# Patient Record
Sex: FEMALE | Race: ASIAN | NOT HISPANIC OR LATINO | URBAN - METROPOLITAN AREA
[De-identification: names, ages, dates, MRNs, and addresses within clinical notes are randomized per-mention and may not be internally consistent; named-entity substitution may affect disease eponyms.]

---

## 2023-09-10 ENCOUNTER — EMERGENCY (EMERGENCY)
Facility: HOSPITAL | Age: 48
LOS: 1 days | Discharge: HOME | End: 2023-09-10
Attending: EMERGENCY MEDICINE | Admitting: EMERGENCY MEDICINE
Payer: COMMERCIAL

## 2023-09-10 VITALS
SYSTOLIC BLOOD PRESSURE: 122 MMHG | RESPIRATION RATE: 18 BRPM | HEART RATE: 68 BPM | DIASTOLIC BLOOD PRESSURE: 69 MMHG | OXYGEN SATURATION: 100 %

## 2023-09-10 VITALS
SYSTOLIC BLOOD PRESSURE: 145 MMHG | OXYGEN SATURATION: 100 % | TEMPERATURE: 98 F | DIASTOLIC BLOOD PRESSURE: 87 MMHG | RESPIRATION RATE: 18 BRPM | HEART RATE: 72 BPM

## 2023-09-10 PROCEDURE — 99285 EMERGENCY DEPT VISIT HI MDM: CPT

## 2023-09-10 PROCEDURE — 73200 CT UPPER EXTREMITY W/O DYE: CPT | Mod: 26,LT

## 2023-09-10 PROCEDURE — 73080 X-RAY EXAM OF ELBOW: CPT | Mod: 26,LT

## 2023-09-10 PROCEDURE — 73090 X-RAY EXAM OF FOREARM: CPT | Mod: 26,LT

## 2023-09-10 PROCEDURE — 73060 X-RAY EXAM OF HUMERUS: CPT | Mod: 26,LT

## 2023-09-10 PROCEDURE — 70450 CT HEAD/BRAIN W/O DYE: CPT | Mod: 26

## 2023-09-10 PROCEDURE — 72125 CT NECK SPINE W/O DYE: CPT | Mod: 26

## 2023-09-10 RX ORDER — IBUPROFEN 200 MG
1 TABLET ORAL
Qty: 30 | Refills: 0
Start: 2023-09-10

## 2023-09-10 RX ORDER — ONDANSETRON 8 MG/1
4 TABLET, FILM COATED ORAL ONCE
Refills: 0 | Status: COMPLETED | OUTPATIENT
Start: 2023-09-10 | End: 2023-09-10

## 2023-09-10 RX ORDER — KETOROLAC TROMETHAMINE 30 MG/ML
15 SYRINGE (ML) INJECTION ONCE
Refills: 0 | Status: DISCONTINUED | OUTPATIENT
Start: 2023-09-10 | End: 2023-09-10

## 2023-09-10 RX ORDER — MORPHINE SULFATE 50 MG/1
4 CAPSULE, EXTENDED RELEASE ORAL ONCE
Refills: 0 | Status: DISCONTINUED | OUTPATIENT
Start: 2023-09-10 | End: 2023-09-10

## 2023-09-10 RX ORDER — OXYCODONE AND ACETAMINOPHEN 5; 325 MG/1; MG/1
1 TABLET ORAL
Qty: 12 | Refills: 0
Start: 2023-09-10 | End: 2023-09-12

## 2023-09-10 RX ADMIN — MORPHINE SULFATE 4 MILLIGRAM(S): 50 CAPSULE, EXTENDED RELEASE ORAL at 14:01

## 2023-09-10 RX ADMIN — Medication 15 MILLIGRAM(S): at 15:42

## 2023-09-10 RX ADMIN — ONDANSETRON 4 MILLIGRAM(S): 8 TABLET, FILM COATED ORAL at 14:01

## 2023-09-10 RX ADMIN — MORPHINE SULFATE 4 MILLIGRAM(S): 50 CAPSULE, EXTENDED RELEASE ORAL at 15:42

## 2023-09-10 NOTE — ED ADULT NURSE NOTE - OBJECTIVE STATEMENT
Patient presents with complaints of severe left elbow and arm pain s/p slip and fall outside in the rain. + deformity. Unsure of she hit her head. DIdn't LOC. Alert and oriented x 3. Denies nausea, vomiting, fever, chills.

## 2023-09-10 NOTE — ED ADULT NURSE NOTE - NSFALLRISKINTERV_ED_ALL_ED
Assistance OOB with selected safe patient handling equipment if applicable/Assistance with ambulation/Communicate fall risk and risk factors to all staff, patient, and family/Monitor gait and stability/Provide visual cue: yellow wristband, yellow gown, etc/Reinforce activity limits and safety measures with patient and family/Call bell, personal items and telephone in reach/Instruct patient to call for assistance before getting out of bed/chair/stretcher/Non-slip footwear applied when patient is off stretcher/[place] to call system/Physically safe environment - no spills, clutter or unnecessary equipment/Purposeful Proactive Rounding/Room/bathroom lighting operational, light cord in reach

## 2023-09-10 NOTE — ED PROVIDER NOTE - PHYSICAL EXAMINATION
Constitutional: crying in pain  HEENT: head normocephalic and atraumatic. moist mucous membranes  Eyes: extraocular movements intact, normal conjunctiva  Neck: supple, normal ROM  Cardiovascular: regular rate   Pulmonary: no respiratory distress  Gastrointestinal: abdomen flat and nondistended, nontender to palpation  Skin: warm, dry, normal for ethnicity  Musculoskeletal: L elbow swollen and very TTP, unable to range elbow 2/2 pain.  no midline spinal TTP  Neurological: oriented x4, no focal neurologic deficit.

## 2023-09-10 NOTE — ED PROVIDER NOTE - OBJECTIVE STATEMENT
48-year-old female no past medical history presents with left elbow pain and deformity after mechanical slip and fall just prior to arrival in ED.  Patient was walking on wet cobblestones and slipped, falling on left elbow.  Unsure if she hit her head.  No LOC.  Able to stand and ambulate after injury.  No open wounds.  Visiting from [place].

## 2023-09-10 NOTE — ED PROVIDER NOTE - NSFOLLOWUPINSTRUCTIONS_ED_ALL_ED_FT
Olecranon Fracture  An olecranon fracture is a break in one of the bones of your elbow. Three bones make up your elbow. These include the two bones of your lower arm (ulna and radius) and the single bone of your upper arm (humerus). The tip of your elbow (olecranon) is part of your ulna. You can feel this hard tip when you bend your elbow. It is easily injured because it has very little padding over it.    What are the causes?  Anatomy of the arm and shoulder showing the tip of the ulna, or the olecranon, in the elbow.  Olecranon fractures commonly occur after falling on a hard surface with a bent elbow. Other causes include:  A forceful hit to the elbow.  A motor vehicle collision.  Falling onto an outstretched arm.  A forceful twist to the elbow.  What increases the risk?  You are more likely to develop this condition if you:  Participate in contact sports or sports in which falls on hard surfaces are common.  Are older. This is when falls are more common.  Have thin or weak bones.  What are the signs or symptoms?  Symptoms of this condition include:  Severe pain, especially when you try to move your elbow.  Swelling.  Bruising.  Stiffness.  Pain or tenderness in your elbow when it is touched.  Numbness in your fingers.  How is this diagnosed?  This condition is diagnosed based on a physical exam and X-rays.    How is this treated?  Treatment depends on the severity and location of the fracture. Many fractures can be treated without surgery. Treatment may include:  Taking pain medicine.  Icing the injury to reduce swelling.  Keeping the elbow still with a cast, splint, or sling (immobilization).  Doing physical therapy to restore movement.  If the elbow is not stable or if bones are out of place, you may need surgery. You may have pins, wires, screws, or plates inserted into the broken bone. Then, you will need to wear a splint, cast, or sling and have physical therapy to restore movement.    Follow these instructions at home:  Medicines    Take over-the-counter and prescription medicines only as told by your health care provider.  Ask your health care provider if the medicine prescribed to you:  Requires you to avoid driving or using heavy machinery.  Can cause constipation. You may need to take actions to prevent or treat constipation, such as:  Drink enough fluid to keep your urine pale yellow.  Take over-the-counter or prescription medicines.  Eat foods that are high in fiber, such as beans, whole grains, and fresh fruits and vegetables.  Limit foods that are high in fat and processed sugars, such as fried or sweet foods.  If you have a splint or sling:    Wear the splint or sling as told by your health care provider. Remove it only as told by your health care provider.  Loosen the splint or sling if your fingers tingle, become numb, or turn cold and blue.  Keep the splint or sling clean and dry.  If you have a cast:    Do not put pressure on any part of the cast until it is fully hardened. This may take several hours.  Do not stick anything inside the cast to scratch your skin. Doing that increases your risk of infection.  Check the skin around the cast every day. Tell your health care provider about any concerns.  You may put lotion on dry skin around the edges of the cast. Do not put lotion on the skin underneath the cast.  Keep the cast clean and dry.  Bathing    Do not take baths, swim, or use a hot tub until your health care provider approves. Ask your health care provider if you may take showers. You may only be allowed to take sponge baths.  If the cast, splint, or sling is not waterproof:  Do not let it get wet.  Cover it with a waterproof covering when you take a bath or shower.  Managing pain, stiffness, and swelling    Bag of ice on a towel on the skin.  If directed, put ice on the injured area.  If you have a removable splint or sling, remove it as told by your health care provider.  Put ice in a plastic bag.  Place a towel between your skin and the bag or between your cast and the bag.  Leave the ice on for 20 minutes, 2–3 times a day.  Move your fingers often to reduce stiffness and swelling.  Raise (elevate) the injured area above the level of your heart while you are sitting or lying down.  Activity    Return to your normal activities as told by your health care provider. Ask your health care provider what activities are safe for you. You may not be able to lift anything with your arm for several weeks.  Do exercises as told by your health care provider or physical therapist.  General instructions    Do not use any products that contain nicotine or tobacco, such as cigarettes, e-cigarettes, and chewing tobacco. These can delay bone healing. If you need help quitting, ask your health care provider.  Ask your health care provider when it is safe to drive if you have a cast, splint, or sling on your arm.  Keep all follow-up visits as told by your health care provider. This is important.  Contact a health care provider if:  You have pain that gets worse.  Your hand and fingers swell.  Your cast or splint becomes loose or damaged.  Get help right away if:  You lose feeling in your hand or fingers.  Your hand or fingers get cold or turn pale or blue.  Summary  An olecranon fracture is a break in one of the bones of your elbow.  Olecranon fractures commonly occur after falling on a hard surface with a bent elbow.  Treatment depends on the severity and location of the fracture.  It is treated with ice, pain medicine, keeping the elbow still (immobilization), physical therapy, and surgery if needed.  This information is not intended to replace advice given to you by your health care provider. Make sure you discuss any questions you have with your health care provider.

## 2023-09-10 NOTE — ED PROVIDER NOTE - CLINICAL SUMMARY MEDICAL DECISION MAKING FREE TEXT BOX
48-year-old female with no past medical history presents with left elbow pain and deformity after mechanical slip and fall directly onto left elbow.  On exam, patient is crying in pain with left elbow swelling and tenderness to palpation.  Unable to range left elbow.  No midline spinal tenderness to palpation.  No evidence of head trauma.  X-ray of left elbow consistent with olecranon fracture, no dislocation.  Discussed with Ortho who reviewed imaging.  No indication for admission.  Patient placed in long-arm splint with plan for Ortho follow-up when she returns home to [place].  CT head and C-spine negative for acute traumatic injury.  No evidence of intrathoracic or intra-abdominal injury on exam.  Will DC with analgesia.  Return precautions discussed.

## 2023-09-10 NOTE — ED PROVIDER NOTE - PATIENT PORTAL LINK FT
You can access the FollowMyHealth Patient Portal offered by  by registering at the following website: [email or web address]. By joining [employer]’s FollowMyHealth portal, you will also be able to view your health information using other applications (apps) compatible with our system.
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
## 2023-09-13 DIAGNOSIS — S52.022A DISPLACED FRACTURE OF OLECRANON PROCESS WITHOUT INTRAARTICULAR EXTENSION OF LEFT ULNA, INITIAL ENCOUNTER FOR CLOSED FRACTURE: ICD-10-CM

## 2023-09-13 DIAGNOSIS — M25.522 PAIN IN LEFT ELBOW: ICD-10-CM
